# Patient Record
Sex: MALE | Race: WHITE
[De-identification: names, ages, dates, MRNs, and addresses within clinical notes are randomized per-mention and may not be internally consistent; named-entity substitution may affect disease eponyms.]

---

## 2019-10-04 ENCOUNTER — HOSPITAL ENCOUNTER (EMERGENCY)
Dept: HOSPITAL 54 - ER | Age: 29
Discharge: HOME | End: 2019-10-04
Payer: COMMERCIAL

## 2019-10-04 VITALS — BODY MASS INDEX: 20.3 KG/M2 | WEIGHT: 145 LBS | HEIGHT: 71 IN

## 2019-10-04 VITALS — SYSTOLIC BLOOD PRESSURE: 136 MMHG | DIASTOLIC BLOOD PRESSURE: 98 MMHG

## 2019-10-04 DIAGNOSIS — K08.89: Primary | ICD-10-CM

## 2019-10-04 DIAGNOSIS — Z59.0: ICD-10-CM

## 2019-10-04 SDOH — ECONOMIC STABILITY - HOUSING INSECURITY: HOMELESSNESS: Z59.0

## 2019-10-04 NOTE — NUR
Social service consult requested by Dr. Gambino for homelessness. Pt. is a 29 year old 
male who came to [facility] complaining of a toothache. ANTONIO met with the pt. bedside. Pt. is alert 
and oriented x 4. Pt. appears disheveled and dirty.  Pt. states he has been homeless for the 
past 4 years. Pt. moved from [place], [place] to be close to the beach and for some 
adventure. Pt. receives food stamps. SW encouraged pt. to connect with [facility] on [address] SW gave pt. the information. Pt. appears to be 
malingering stating he wants medication specifically Ativan. Pt. was given Moltrin for his 
toothache. Pt. says he is suicidal but does not have a plan. Pt. states, "I will leave if 
you can get me my insurance number." SW provided pt.with his presumptive-[employer] insurance 
number, a sandwich, juice and TAP card. 



Pt. was offered shelter placement, however pt. declined. Pt. did accept the following 
resources: [facility] located at [address] [facility] [phone];  [facility] 
[place], [address] [place] [address], [place] [phone]; [facility], [address] [facility] [phone]; [facility] Homeless Resource Directory which includes food 
stamps, transitional housing, showers and hot meals etc; Mental Health clinics such as [facility] [phone]; [facility] [phone]; Health 
clinics;[facility] [phone] and Alcohol treatment centers such 
as [facility], [phone]; [facility] Substance Abuse Hotline [phone]) 905-1752 and [facility] [phone]. 



Homeless patient waiver form was signed by the pt. and placed in pt's chart. Dr. Gambino 
has been updated with pt's discharge plan.

## 2025-05-30 ENCOUNTER — HOSPITAL ENCOUNTER (EMERGENCY)
Dept: HOSPITAL 87 - ER | Age: 35
LOS: 1 days | Discharge: HOME | End: 2025-05-31
Payer: COMMERCIAL

## 2025-05-30 VITALS — OXYGEN SATURATION: 100 %

## 2025-05-30 DIAGNOSIS — F10.129: Primary | ICD-10-CM
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
## 2025-05-30 DIAGNOSIS — R51.9: ICD-10-CM

## 2025-05-30 LAB
BASOPHILS NFR BLD AUTO: 0.2 % (ref 0–2)
BUN SERPL-MCNC: 15 MG/DL (ref 9–23)
CALCIUM SERPL-MCNC: 9.2 MG/DL (ref 8.7–10.4)
CHLORIDE SERPL-SCNC: 100 MEQ/L (ref 98–107)
CO2 SERPL-SCNC: 27 MEQ/L (ref 21–32)
CREAT SERPL-MCNC: 1.1 MG/DL (ref 0.6–1.3)
EOSINOPHIL NFR BLD AUTO: 0.9 % (ref 0–5)
ERYTHROCYTE [DISTWIDTH] IN BLOOD BY AUTOMATED COUNT: 15.2 % (ref 11.6–14.6)
ETHANOL SERPL-MCNC: 29 MG/DL (ref ?–10)
GLUCOSE SERPL-MCNC: 81 MG/DL (ref 70–105)
HCT VFR BLD AUTO: 44.9 % (ref 42–52)
HGB BLD-MCNC: 14.8 G/DL (ref 14–18)
LYMPHOCYTES NFR BLD AUTO: 28.3 % (ref 20–50)
MCH RBC QN AUTO: 30.4 PG (ref 28–32)
MCHC RBC AUTO-ENTMCNC: 33.1 G/DL (ref 31–37)
MONOCYTES NFR BLD AUTO: 8.5 % (ref 2–8)
NEUTROPHILS NFR BLD AUTO: 62.1 % (ref 40–76)
PLATELET # BLD AUTO: 328 X1000/UL (ref 130–400)
PMV BLD AUTO: 7.6 FL (ref 7.4–10.4)
POTASSIUM SERPL-SCNC: 3.9 MEQ/L (ref 3.5–5.1)
RBC # BLD AUTO: 4.88 MILL/UL (ref 4.7–6.1)
SODIUM SERPL-SCNC: 137 MEQ/L (ref 136–145)
WBC # BLD: 9.9 X1000/UL (ref 4.5–11)

## 2025-05-30 PROCEDURE — 70450 CT HEAD/BRAIN W/O DYE: CPT

## 2025-05-30 PROCEDURE — 96374 THER/PROPH/DIAG INJ IV PUSH: CPT

## 2025-05-30 PROCEDURE — 80048 BASIC METABOLIC PNL TOTAL CA: CPT

## 2025-05-30 PROCEDURE — 96361 HYDRATE IV INFUSION ADD-ON: CPT

## 2025-05-30 PROCEDURE — G0480 DRUG TEST DEF 1-7 CLASSES: HCPCS

## 2025-05-30 PROCEDURE — 99285 EMERGENCY DEPT VISIT HI MDM: CPT

## 2025-05-30 PROCEDURE — 80320 DRUG SCREEN QUANTALCOHOLS: CPT

## 2025-05-30 PROCEDURE — 85025 COMPLETE CBC W/AUTO DIFF WBC: CPT

## 2025-05-30 PROCEDURE — 36415 COLL VENOUS BLD VENIPUNCTURE: CPT

## 2025-05-30 RX ADMIN — Medication ONE MG: at 23:00

## 2025-05-30 RX ADMIN — ACETAMINOPHEN ONE MG: 325 TABLET, FILM COATED ORAL at 23:00

## 2025-05-30 RX ADMIN — DEXTROSE ONE MLS/HR: 5 SOLUTION INTRAVENOUS at 23:00

## 2025-05-31 VITALS
RESPIRATION RATE: 12 BRPM | TEMPERATURE: 98.42 F | OXYGEN SATURATION: 100 % | HEART RATE: 100 BPM | DIASTOLIC BLOOD PRESSURE: 67 MMHG | SYSTOLIC BLOOD PRESSURE: 105 MMHG